# Patient Record
Sex: MALE | Race: WHITE
[De-identification: names, ages, dates, MRNs, and addresses within clinical notes are randomized per-mention and may not be internally consistent; named-entity substitution may affect disease eponyms.]

---

## 2018-03-29 ENCOUNTER — HOSPITAL ENCOUNTER (EMERGENCY)
Dept: HOSPITAL 17 - NEPC | Age: 63
Discharge: HOME | End: 2018-03-29
Payer: SELF-PAY

## 2018-03-29 VITALS
RESPIRATION RATE: 16 BRPM | DIASTOLIC BLOOD PRESSURE: 60 MMHG | HEART RATE: 81 BPM | SYSTOLIC BLOOD PRESSURE: 129 MMHG | OXYGEN SATURATION: 96 %

## 2018-03-29 VITALS — WEIGHT: 153.33 LBS | HEIGHT: 70 IN | BODY MASS INDEX: 21.95 KG/M2

## 2018-03-29 VITALS
TEMPERATURE: 98.7 F | HEART RATE: 91 BPM | DIASTOLIC BLOOD PRESSURE: 71 MMHG | SYSTOLIC BLOOD PRESSURE: 124 MMHG | RESPIRATION RATE: 16 BRPM | OXYGEN SATURATION: 97 %

## 2018-03-29 DIAGNOSIS — R42: ICD-10-CM

## 2018-03-29 DIAGNOSIS — Z79.02: ICD-10-CM
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
## 2018-03-29 DIAGNOSIS — I25.10: ICD-10-CM

## 2018-03-29 DIAGNOSIS — S81.812A: Primary | ICD-10-CM

## 2018-03-29 DIAGNOSIS — Z79.899: ICD-10-CM

## 2018-03-29 DIAGNOSIS — Z23: ICD-10-CM

## 2018-03-29 DIAGNOSIS — Z79.82: ICD-10-CM

## 2018-03-29 LAB
BASOPHILS # BLD AUTO: 0.1 TH/MM3 (ref 0–0.2)
BASOPHILS NFR BLD: 0.6 % (ref 0–2)
BUN SERPL-MCNC: 6 MG/DL (ref 7–18)
CALCIUM SERPL-MCNC: 8 MG/DL (ref 8.5–10.1)
CHLORIDE SERPL-SCNC: 99 MEQ/L (ref 98–107)
CREAT SERPL-MCNC: 0.66 MG/DL (ref 0.6–1.3)
EOSINOPHIL # BLD: 0.1 TH/MM3 (ref 0–0.4)
EOSINOPHIL NFR BLD: 0.4 % (ref 0–4)
ERYTHROCYTE [DISTWIDTH] IN BLOOD BY AUTOMATED COUNT: 13.1 % (ref 11.6–17.2)
GFR SERPLBLD BASED ON 1.73 SQ M-ARVRAT: 122 ML/MIN (ref 89–?)
GLUCOSE SERPL-MCNC: 83 MG/DL (ref 74–106)
HCO3 BLD-SCNC: 21.6 MEQ/L (ref 21–32)
HCT VFR BLD CALC: 33.5 % (ref 39–51)
HGB BLD-MCNC: 11.7 GM/DL (ref 13–17)
INR PPP: 1 RATIO
LYMPHOCYTES # BLD AUTO: 1 TH/MM3 (ref 1–4.8)
LYMPHOCYTES NFR BLD AUTO: 7.3 % (ref 9–44)
MCH RBC QN AUTO: 34.9 PG (ref 27–34)
MCHC RBC AUTO-ENTMCNC: 34.8 % (ref 32–36)
MCV RBC AUTO: 100.5 FL (ref 80–100)
MONOCYTE #: 1.4 TH/MM3 (ref 0–0.9)
MONOCYTES NFR BLD: 10.6 % (ref 0–8)
NEUTROPHILS # BLD AUTO: 11 TH/MM3 (ref 1.8–7.7)
NEUTROPHILS NFR BLD AUTO: 81.1 % (ref 16–70)
PLATELET # BLD: 315 TH/MM3 (ref 150–450)
PMV BLD AUTO: 6.5 FL (ref 7–11)
PROTHROMBIN TIME: 10 SEC (ref 9.8–11.6)
RBC # BLD AUTO: 3.34 MIL/MM3 (ref 4.5–5.9)
SODIUM SERPL-SCNC: 130 MEQ/L (ref 136–145)
WBC # BLD AUTO: 13.6 TH/MM3 (ref 4–11)

## 2018-03-29 PROCEDURE — 99284 EMERGENCY DEPT VISIT MOD MDM: CPT

## 2018-03-29 PROCEDURE — 73590 X-RAY EXAM OF LOWER LEG: CPT

## 2018-03-29 PROCEDURE — 90471 IMMUNIZATION ADMIN: CPT

## 2018-03-29 PROCEDURE — 12002 RPR S/N/AX/GEN/TRNK2.6-7.5CM: CPT

## 2018-03-29 PROCEDURE — 85025 COMPLETE CBC W/AUTO DIFF WBC: CPT

## 2018-03-29 PROCEDURE — 90714 TD VACC NO PRESV 7 YRS+ IM: CPT

## 2018-03-29 PROCEDURE — 80048 BASIC METABOLIC PNL TOTAL CA: CPT

## 2018-03-29 PROCEDURE — 85730 THROMBOPLASTIN TIME PARTIAL: CPT

## 2018-03-29 PROCEDURE — 85610 PROTHROMBIN TIME: CPT

## 2018-03-29 PROCEDURE — 96374 THER/PROPH/DIAG INJ IV PUSH: CPT

## 2018-03-29 NOTE — RADRPT
EXAM DATE/TIME:  03/29/2018 16:06 

 

[facility] COMPARISON:     

No previous studies available for comparison.

 

                     

INDICATIONS :     

Left tibia laceration after falling on a boat propeller.

                     

 

MEDICAL HISTORY :     

None.          

 

SURGICAL HISTORY :     

None.   

 

ENCOUNTER:     

Initial                                        

 

ACUITY:     

1 day      

 

PAIN SCORE:     

5/10

 

LOCATION:     

Left middle tibia.

 

FINDINGS:     

Two view examination of the left tibia demonstrates no evidence of fracture or dislocation.  Bony min
eralization is normal. No radiodense foreign bodies appreciated.

 

CONCLUSION:     No acute bony injury

 

 

 

 Justino Nichols MD on March 29, 2018 at 16:11           

Board Certified Radiologist.

 This report was verified electronically.

## 2018-03-29 NOTE — PD
HPI


Chief Complaint:  Laceration to the leg


Time Seen by Provider:  15:40


Travel History


International Travel<30 days:  No


Contact w/Intl Traveler<30days:  No





History of Present Illness


HPI


62-year-old male patient with history of CAD, stenting, presents to the ER 

today brought in by EMS because he had been cut by a boat propeller on the left 

leg, had large amount of bleeding at the scene, tourniquet was applied.  He 

reports feeling some lightheadedness.  He denies any other issues or injuries.





Modifying Factors: None


Associated Signs & Symptoms: Left leg laceration, from both propellers


Risk Factors: None





PFSH


Social History


Tobacco Use:  No





Allergies-Medications


(Allergen,Severity, Reaction):  


Coded Allergies:  


     No Known Allergies (Unverified , 3/29/18)


Reported Meds & Prescriptions





Reported Meds & Active Scripts


Active


Reported


Aspirin 81 Mg Chew 81 Mg CHEW DAILY


Atorvastatin (Atorvastatin Calcium) 10 Mg Tab 10 Mg PO HS


Clopidogrel (Clopidogrel Bisulfate) 75 Mg Tab 75 Mg PO DAILY








Review of Systems


Except as stated in HPI:  all other systems reviewed are Neg





Physical Exam


Narrative


GENERAL: Well-developed elderly male patient currently in mild distress.  Awake 

and oriented 3.


SKIN: Focused skin assessment warm/dry.


HEAD: Atraumatic. Normocephalic. 


EYES: Pupils equal and round. No scleral icterus. No injection or drainage. 


ENT: No nasal bleeding or discharge.  Mucous membranes pink and moist.


NECK: Trachea midline. No JVD. 


CARDIOVASCULAR: Regular rate and rhythm.  No murmur appreciated.


RESPIRATORY: No accessory muscle use. Clear to auscultation. Breath sounds 

equal bilaterally. 


GASTROINTESTINAL: Abdomen soft, non-tender, nondistended. Hepatic and splenic 

margins not palpable. 


MUSCULOSKELETAL: No obvious deformities. No clubbing.  No cyanosis.  No edema. 


EXTREMITIES: No clubbing, cyanosis, or edema. No joint tenderness, effusion, or 

edema noted.  There is a 8 cm laceration to the left lower leg area, bleeding 

controlled.


NEUROLOGICAL: Awake and alert. No obvious cranial nerve deficits.  Motor 

grossly within normal limits. Normal speech.


PSYCHIATRIC: Appropriate mood and affect; insight and judgment normal.





Data


Data


Orders





 Orders


Complete Blood Count With Diff (3/29/18 15:40)


Basic Metabolic Panel (Bmp) (3/29/18 15:40)


Prothrombin Time / Inr (Pt) (3/29/18 15:40)


Act Partial Throm Time (Ptt) (3/29/18 15:40)


Type And Screen (3/29/18 15:40)


Tibia/Fibula (Ap/Lat) (3/29/18 15:49)


Lidocai-Epi 1%-1:100,000 Inj (Xylocaine- (3/29/18 16:00)


Lidocaine Pf 1% Inj (Xylocaine-Mpf 1% In (3/29/18 16:12)


Morphine Inj (Morphine Inj) (3/29/18 17:30)


Ed Discharge Order (3/29/18 18:15)





Labs





Laboratory Tests








Test


  3/29/18


16:00


 


White Blood Count 13.6 TH/MM3 


 


Red Blood Count 3.34 MIL/MM3 


 


Hemoglobin 11.7 GM/DL 


 


Hematocrit 33.5 % 


 


Mean Corpuscular Volume 100.5 FL 


 


Mean Corpuscular Hemoglobin 34.9 PG 


 


Mean Corpuscular Hemoglobin


Concent 34.8 % 


 


 


Red Cell Distribution Width 13.1 % 


 


Platelet Count 315 TH/MM3 


 


Mean Platelet Volume 6.5 FL 


 


Neutrophils (%) (Auto) 81.1 % 


 


Lymphocytes (%) (Auto) 7.3 % 


 


Monocytes (%) (Auto) 10.6 % 


 


Eosinophils (%) (Auto) 0.4 % 


 


Basophils (%) (Auto) 0.6 % 


 


Neutrophils # (Auto) 11.0 TH/MM3 


 


Lymphocytes # (Auto) 1.0 TH/MM3 


 


Monocytes # (Auto) 1.4 TH/MM3 


 


Eosinophils # (Auto) 0.1 TH/MM3 


 


Basophils # (Auto) 0.1 TH/MM3 


 


CBC Comment DIFF FINAL 


 


Differential Comment  


 


Prothrombin Time 10.0 SEC 


 


Prothromb Time International


Ratio 1.0 RATIO 


 


 


Activated Partial


Thromboplast Time 21.9 SEC 


 


 


Blood Urea Nitrogen 6 MG/DL 


 


Creatinine 0.66 MG/DL 


 


Random Glucose 83 MG/DL 


 


Calcium Level 8.0 MG/DL 


 


Sodium Level 130 MEQ/L 


 


Potassium Level 4.2 MEQ/L 


 


Chloride Level 99 MEQ/L 


 


Carbon Dioxide Level 21.6 MEQ/L 


 


Anion Gap 9 MEQ/L 


 


Estimat Glomerular Filtration


Rate 122 ML/MIN 


 











MDM


Medical Decision Making


Medical Screen Exam Complete:  Yes


Emergency Medical Condition:  Yes


Medical Record Reviewed:  Yes


Interpretation(s)





Laboratory Tests








Test


  3/29/18


16:00


 


White Blood Count


  13.6 TH/MM3


(4.0-11.0)


 


Red Blood Count


  3.34 MIL/MM3


(4.50-5.90)


 


Hemoglobin


  11.7 GM/DL


(13.0-17.0)


 


Hematocrit


  33.5 %


(39.0-51.0)


 


Mean Corpuscular Volume


  100.5 FL


(80.0-100.0)


 


Mean Corpuscular Hemoglobin


  34.9 PG


(27.0-34.0)


 


Mean Platelet Volume


  6.5 FL


(7.0-11.0)


 


Neutrophils (%) (Auto)


  81.1 %


(16.0-70.0)


 


Lymphocytes (%) (Auto)


  7.3 %


(9.0-44.0)


 


Monocytes (%) (Auto)


  10.6 %


(0.0-8.0)


 


Neutrophils # (Auto)


  11.0 TH/MM3


(1.8-7.7)


 


Monocytes # (Auto)


  1.4 TH/MM3


(0-0.9)


 


Activated Partial


Thromboplast Time 21.9 SEC


(24.3-30.1)


 


Blood Urea Nitrogen 6 MG/DL (7-18) 


 


Calcium Level


  8.0 MG/DL


(8.5-10.1)


 


Sodium Level


  130 MEQ/L


(136-145)








Last 24 hours Impressions








Tibia/Fibula X-Ray 3/29/18 1549 Signed





Impressions: 





 Service Date/Time:  Thursday, March 29, 2018 16:06 - CONCLUSION: No acute bony 





 injury     Justino Nichols MD 








Differential Diagnosis


Left leg laceration


Narrative Course


Tib-fib x-ray did not show any foreign bodies or bony injuries.  Laceration was 

sutured by my PA.  And at this point, lab work appears to show no significant 

drop in hemoglobin.  Vital signs are stable.  Plan would be to release him with 

follow-up to primary care doctor, suture removal will need to be done in 2 

weeks.  Wound care instructions are given.  The plan was discussed with the 

patient he states understanding.





Diagnosis





 Primary Impression:  


 Leg laceration


***Med/Other Pt SpecificInfo:  Prescription(s) given


Scripts


Doxycycline Hyclate (Doxycycline Hyclate) 100 Mg Cap


100 MG PO BID for Infection, #20 CAP 0 Refills


   Prov: Heath Pollard MD         3/29/18


Disposition:  01 DISCHARGE HOME


Condition:  Stable











Heath Pollard MD Mar 29, 2018 16:08